# Patient Record
Sex: FEMALE | Race: WHITE
[De-identification: names, ages, dates, MRNs, and addresses within clinical notes are randomized per-mention and may not be internally consistent; named-entity substitution may affect disease eponyms.]

---

## 2021-01-19 ENCOUNTER — HOSPITAL ENCOUNTER (EMERGENCY)
Dept: HOSPITAL 41 - JD.ED | Age: 26
Discharge: HOME | End: 2021-01-19
Payer: MEDICAID

## 2021-01-19 DIAGNOSIS — M54.5: ICD-10-CM

## 2021-01-19 DIAGNOSIS — K21.9: ICD-10-CM

## 2021-01-19 DIAGNOSIS — Z72.0: ICD-10-CM

## 2021-01-19 DIAGNOSIS — Z79.899: ICD-10-CM

## 2021-01-19 DIAGNOSIS — R10.31: Primary | ICD-10-CM

## 2021-01-19 DIAGNOSIS — Z88.0: ICD-10-CM

## 2021-01-19 NOTE — EDM.PDOC
ED HPI GENERAL MEDICAL PROBLEM





- General


Chief Complaint: Abdominal Pain


Stated Complaint: RIGHT SIDE PAIN


Time Seen by Provider: 01/19/21 19:21


Source of Information: Reports: Patient, RN Notes Reviewed





- History of Present Illness


INITIAL COMMENTS - FREE TEXT/NARRATIVE: 





25 yr old female comes in with L low back and flank pain for 2 days that at 

times radiates to R groin.  She was seen at the clinic a short time ago, Ua was 

checked, reported to be "normal".   No voiding sx.  No fever, chills, nausea or 

vomiting.  No known recent injury.  Hx of kidney stones. Still has her appendix.

  


Treatments PTA: Reports: Other (see below)


Other Treatments PTA: UA prior to arrival


  ** Right Abdomen


Pain Score (Numeric/FACES): 7





- Related Data


                                    Allergies











Allergy/AdvReac Type Severity Reaction Status Date / Time


 


Penicillins Allergy  Anaphylactic Verified 01/19/21 19:22





   Shock  











Home Meds: 


                                    Home Meds





ARIPiprazole [Abilify] 10 mg PO DAILY 01/19/21 [History]


Acetaminophen/HYDROcodone [Norco 325-5 MG] 1 tab PO Q6H PRN #10 tablet 01/19/21 

[Rx]


Celecoxib [CeleBREX] 100 mg PO DAILY PRN 01/19/21 [History]


Famotidine [Pepcid] 20 mg PO BEDTIME 01/19/21 [History]


Loperamide [Imodium] 2 mg PO ASDIRECTED PRN 01/19/21 [History]


Omeprazole 40 mg PO BID 01/19/21 [History]


Propranolol [Inderal] 20 mg PO TID 01/19/21 [History]


QUEtiapine [SEROquel] 50 - 100 mg PO BEDTIME 01/19/21 [History]


lamoTRIgine [LaMICtal] 150 mg PO DAILY 01/19/21 [History]











Past Medical History


Gastrointestinal History: Reports: GERD


OB/GYN History: Reports: Pregnancy


Psychiatric History: Reports: Bipolar





- Past Surgical History


HEENT Surgical History: Reports: Adenoidectomy, Tonsillectomy


Female  Surgical History: Reports: Kidney stone extraction, Lithotripsy/ESWL, 

Tubal Ligation, Ureteral Stent





Social & Family History





- Tobacco Use


Tobacco Use Status *Q: Current Every Day Tobacco User


Years of Tobacco use: 10


Packs/Tins Daily: 0.7





- Caffeine Use


Caffeine Use: Reports: Coffee, Soda, Tea





- Alcohol Use


Days Per Week of Alcohol Use: 7


Number of Drinks Per Day: 6


Total Drinks Per Week: 42





- Recreational Drug Use


Recreational Drug Use: Yes


Drug Use in Last 12 Months: Yes


Recreational Drug Type: Reports: Marijuana/Hashish





ED ROS GENERAL





- Review of Systems


Review Of Systems: See Below


Constitutional: Denies: Fever, Chills, Diaphoresis


HEENT: Reports: No Symptoms


Respiratory: Denies: Shortness of Breath


Cardiovascular: Denies: Chest Pain


GI/Abdominal: Reports: Abdominal Pain.  Denies: Nausea, Vomiting


: Denies: Dysuria, Frequency


Musculoskeletal: Reports: Back Pain


Skin: Reports: No Symptoms


Neurological: Reports: No Symptoms





ED EXAM,LOWER BACK PAIN/INJURY





- Physical Exam


Exam: See Below


General Appearance: Alert, No Apparent Distress


Head: Atraumatic


Neck: Supple


Respiratory/Chest: No Respiratory Distress, Lungs Clear, Normal Breath Sounds


Cardiovascular: Regular Rate, Rhythm


GI/Abdominal: Soft, Non-Tender.  No: Guarding


Back Exam: CVA Tenderness (R) (mild).  No: Paraspinal Tenderness, Vertebral 

Tenderness


Extremities: Normal Inspection, Normal Range of Motion


Neurological: Alert, No Motor/Sensory Deficits


Skin Exam: Warm, Dry, Normal Color, No Rash





Course





- Vital Signs


Last Recorded V/S: 


                                Last Vital Signs











Temp  97.5 F   01/19/21 19:26


 


Pulse  92   01/19/21 19:26


 


Resp  16   01/19/21 19:26


 


BP  135/85   01/19/21 19:26


 


Pulse Ox  98   01/19/21 19:26














- Orders/Labs/Meds


Orders: 


                               Active Orders 24 hr











 Category Date Time Status


 


 Abdomen Pelvis wo Cont [CT] Stat Exams  01/19/21 19:33 Taken











Labs: 


                                Laboratory Tests











  01/19/21 01/19/21 Range/Units





  19:43 19:43 


 


WBC   13.85 H  (3.98-10.04)  K/mm3


 


RBC   4.77  (3.98-5.22)  M/mm3


 


Hgb   13.6  (11.2-15.7)  gm/dl


 


Hct   43.2  (34.1-44.9)  %


 


MCV   90.6  (79.4-94.8)  fl


 


MCH   28.5  (25.6-32.2)  pg


 


MCHC   31.5 L  (32.2-35.5)  g/dl


 


RDW Std Deviation   45.8  (36.4-46.3)  fL


 


Plt Count   292  (182-369)  K/mm3


 


MPV   9.9  (9.4-12.3)  fl


 


Neut % (Auto)   62.5  (34.0-71.1)  %


 


Lymph % (Auto)   26.6  (19.3-51.7)  %


 


Mono % (Auto)   6.5  (4.7-12.5)  %


 


Eos % (Auto)   3.9  (0.7-5.8)  


 


Baso % (Auto)   0.2  (0.1-1.2)  %


 


Neut # (Auto)   8.66 H  (1.56-6.13)  K/mm3


 


Lymph # (Auto)   3.68  (1.18-3.74)  K/mm3


 


Mono # (Auto)   0.90 H  (0.24-0.36)  K/mm3


 


Eos # (Auto)   0.54 H  (0.04-0.36)  K/mm3


 


Baso # (Auto)   0.03  (0.01-0.08)  K/mm3


 


Manual Slide Review   Normal smear  


 


C-Reactive Protein  <0.2   (<1.0)  mg/dL











Meds: 


Medications














Discontinued Medications














Generic Name Dose Route Start Last Admin





  Trade Name Freq  PRN Reason Stop Dose Admin


 


Oxycodone/Acetaminophen  1 tab  01/19/21 19:34  01/19/21 19:40





  Percocet 325-5 Mg  PO  01/19/21 19:35  1 tab





  ONETIME ONE   Administration














- Re-Assessments/Exams


Free Text/Narrative Re-Assessment/Exam: 





01/19/21 21:14


CT does not show any acute findings, appendix visualized and appears normal.  

WBC 13,000, CRP 0.2.  She states Ua checked at clinic this late afternoon was 

normal so that has not been repeated.   Discharge instr. as documented. 








Departure





- Departure


Time of Disposition: 21:18


Disposition: Home, Self-Care 01


Condition: Fair


Clinical Impression: 


 Back pain





Abdominal pain


Qualifiers:


 Abdominal location: right lower quadrant Qualified Code(s): R10.31 - Right 

lower quadrant pain








- Discharge Information


Prescriptions: 


Acetaminophen/HYDROcodone [Norco 325-5 MG] 1 tab PO Q6H PRN #10 tablet


 PRN Reason: Pain


Instructions:  Acute Back Pain, Adult, Abdominal Pain, Adult, Easy-to-Read


Referrals: 


Dorcas Gan MD [Primary Care Provider] - 


Forms:  ED Department Discharge


Additional Instructions: 


Clear liquids and very bland diet as tolerated.  Alternate heat and ice packs as

needed.  Alternate tylenol and ibuprofen for mild to moderate discomfort or 

hydrocodone if needed for more severe pain. Prescription has been sent 

electronic to [facility].   See Dr Kline Friday as planned.  Return 

to ED as needed.  





Sepsis Event Note (ED)





- Evaluation


Sepsis Screening Result: No Definite Risk





- Focused Exam


Vital Signs: 


                                   Vital Signs











  Temp Pulse Resp BP Pulse Ox


 


 01/19/21 19:26  97.5 F  92  16  135/85  98














- My Orders


Last 24 Hours: 


My Active Orders





01/19/21 19:33


Abdomen Pelvis wo Cont [CT] Stat 














- Assessment/Plan


Last 24 Hours: 


My Active Orders





01/19/21 19:33


Abdomen Pelvis wo Cont [CT] Stat

## 2021-01-20 NOTE — CT
CT abdomen and pelvis

 

Technique: Multiple axial sections were obtained from above the dome 

of the diaphragm inferiorly through the pubic symphysis.  Intravenous 

and oral contrast not utilized.  Study has been performed as a 

ureteral stone protocol.

 

Comparison: No previous study is available.

 

Findings: Small portion of the visualized lung bases show nothing 

acute.

 

Liver is enlarged and shows diffuse fatty infiltration.  Spleen 

appears within normal limits.  Adrenal glands show no nodule.  

Pancreas shows no discrete abnormality.  Gallbladder is mostly 

collapsed without definite calcifications to indicate calcified 

gallstones.

 

Both kidneys show multiple small nonobstructing calculi.  Calculi 

measure 5.9 mm and less in length.  No hydronephrosis is seen within 

either kidney.  No ureteral dilatation or ureteral stone is seen.  No 

bladder calculi are seen.

 

Aorta shows no aneurysm.  No retroperitoneal adenopathy or mesenteric 

abnormalities are seen.  Appendix is seen which is normal.  No pelvic 

mass or adenopathy is identified.  No free fluid or inflammatory 

change is appreciated.

 

Bone window settings were reviewed.  No acute osseous abnormality is 

appreciated.

 

Impression:

1.  Numerous nonobstructing small calculi within both kidneys.  No 

ureteral dilatation or ureteral stone is seen.

2.  Fatty infiltration within the liver.

3.  Nothing acute is definitely appreciated.

 

Diagnostic code #3

 

I agree with preliminary report from [facility], finalized on 01/19/21, 9:45

 PM CST

## 2021-02-14 ENCOUNTER — HOSPITAL ENCOUNTER (EMERGENCY)
Dept: HOSPITAL 41 - JD.ED | Age: 26
Discharge: HOME | End: 2021-02-14
Payer: MEDICAID

## 2021-02-14 DIAGNOSIS — M54.5: Primary | ICD-10-CM

## 2021-02-14 DIAGNOSIS — Z79.899: ICD-10-CM

## 2021-02-14 DIAGNOSIS — K21.9: ICD-10-CM

## 2021-02-14 DIAGNOSIS — R11.0: ICD-10-CM

## 2021-02-14 DIAGNOSIS — Z88.0: ICD-10-CM

## 2021-02-14 DIAGNOSIS — Z72.0: ICD-10-CM

## 2021-02-14 PROCEDURE — 81003 URINALYSIS AUTO W/O SCOPE: CPT

## 2021-02-14 PROCEDURE — 99284 EMERGENCY DEPT VISIT MOD MDM: CPT

## 2021-02-14 PROCEDURE — 96372 THER/PROPH/DIAG INJ SC/IM: CPT

## 2021-02-14 PROCEDURE — 74176 CT ABD & PELVIS W/O CONTRAST: CPT

## 2021-02-14 NOTE — EDM.PDOC
<Brennan Payne ISIS - Last Filed: 02/14/21 10:45>





ED HPI GENERAL MEDICAL PROBLEM





- General


Chief Complaint: Back Pain or Injury


Stated Complaint: KIDNEY PAIN/ R SIDE BACK PAIN


Time Seen by Provider: 02/14/21 10:45


  ** Right Middle Back


Pain Score (Numeric/FACES): 8





- Related Data


                                    Allergies











Allergy/AdvReac Type Severity Reaction Status Date / Time


 


Penicillins Allergy  Anaphylactic Verified 02/14/21 09:51





   Shock  











Home Meds: 


                                    Home Meds





ARIPiprazole [Abilify] 10 mg PO DAILY 01/19/21 [History]


Celecoxib [CeleBREX] 100 mg PO DAILY PRN 01/19/21 [History]


Famotidine [Pepcid] 20 mg PO BEDTIME 01/19/21 [History]


Loperamide [Imodium] 2 mg PO ASDIRECTED PRN 01/19/21 [History]


Omeprazole 40 mg PO BID 01/19/21 [History]


Propranolol [Inderal] 20 mg PO TID 01/19/21 [History]


QUEtiapine [SEROquel] 50 - 100 mg PO BEDTIME 01/19/21 [History]


lamoTRIgine [LaMICtal] 150 mg PO DAILY 01/19/21 [History]


Levothyroxine [Synthroid] 50 mcg PO DAILY 02/14/21 [History]











Past Medical History


Gastrointestinal History: Reports: GERD


OB/GYN History: Reports: Pregnancy


Psychiatric History: Reports: Bipolar





- Past Surgical History


HEENT Surgical History: Reports: Adenoidectomy, Tonsillectomy


Female  Surgical History: Reports: Kidney stone extraction, Lithotripsy/ESWL, 

Tubal Ligation, Ureteral Stent





Social & Family History





- Tobacco Use


Tobacco Use Status *Q: Current Every Day Tobacco User


Years of Tobacco use: 5


Packs/Tins Daily: 0.7





- Caffeine Use


Caffeine Use: Reports: None





- Recreational Drug Use


Recreational Drug Use: No





Departure





- Discharge Information


Referrals: 


Dorcas Gan MD [Primary Care Provider] - 


Forms:  ED Department Discharge





Sepsis Event Note (ED)





- Evaluation


Sepsis Screening Result: No Definite Risk





<Maribel Francois - Last Filed: 02/14/21 11:43>





ED HPI GENERAL MEDICAL PROBLEM





- General


Source of Information: Reports: Patient, RN Notes Reviewed


History Limitations: Reports: No Limitations





Course





- Vital Signs


Last Recorded V/S: 





                                Last Vital Signs











Temp  97.5 F   02/14/21 09:47


 


Pulse  98   02/14/21 09:47


 


Resp  16   02/14/21 09:47


 


BP  122/93 H  02/14/21 09:47


 


Pulse Ox  98   02/14/21 09:47














- Orders/Labs/Meds


Orders: 





                               Active Orders 24 hr











 Category Date Time Status


 


 Abdomen Pelvis wo Cont [CT] Stat Exams  02/14/21 11:17 Ordered











Labs: 





                                Laboratory Tests











  02/14/21 Range/Units





  10:20 


 


Urine Color  Yellow  (Yellow)  


 


Urine Appearance  Clear  (Clear)  


 


Urine pH  6.5  (5.0-8.0)  


 


Ur Specific Gravity  1.020  (1.005-1.030)  


 


Urine Protein  Negative  (Negative)  


 


Urine Glucose (UA)  Negative  (Negative)  


 


Urine Ketones  Negative  (Negative)  


 


Urine Occult Blood  Negative  (Negative)  


 


Urine Nitrite  Negative  (Negative)  


 


Urine Bilirubin  Negative  (Negative)  


 


Urine Urobilinogen  0.2  (0.2-1.0)  


 


Ur Leukocyte Esterase  Negative  (Negative)  











Meds: 





Medications














Discontinued Medications














Generic Name Dose Route Start Last Admin





  Trade Name Freq  PRN Reason Stop Dose Admin


 


Cyclobenzaprine HCl  10 mg  02/14/21 11:21  02/14/21 11:31





  Flexeril  PO  02/14/21 11:22  10 mg





  ONETIME ONE   Administration


 


Ketorolac Tromethamine  60 mg  02/14/21 11:21  02/14/21 11:31





  Toradol  IM  02/14/21 11:22  60 mg





  ONETIME ONE   Administration


 


Ondansetron HCl  Confirm  02/14/21 10:26  02/14/21 10:28





  Zofran Odt  Administered  02/14/21 10:27  Not Given





  Dose  





  4 mg  





  .ROUTE  





  .STK-MED ONE  


 


Ondansetron HCl  4 mg  02/14/21 10:27  02/14/21 10:28





  Zofran Odt  PO  02/14/21 10:28  4 mg





  ONETIME STA   Administration














Sepsis Event Note (ED)





- Focused Exam


Vital Signs: 





                                   Vital Signs











  Temp Pulse Resp BP Pulse Ox


 


 02/14/21 09:47  97.5 F  98  16  122/93 H  98














- My Orders


Last 24 Hours: 





My Active Orders





02/14/21 11:17


Abdomen Pelvis wo Cont [CT] Stat 














- Assessment/Plan


Last 24 Hours: 





My Active Orders





02/14/21 11:17


Abdomen Pelvis wo Cont [CT] Stat

## 2021-02-14 NOTE — CT
CT abdomen and pelvis

 

Technique: Multiple axial sections were obtained from above the 

kidneys inferiorly through the pubic symphysis.  Intravenous and oral 

contrast was not utilized.  Study has been performed as a renal stone 

protocol.

 

Comparison: Prior CT abdomen and pelvis exam of 01/19/21.

 

Findings: Visualized lung bases showed nothing acute.

 

Diffuse fatty infiltration is seen within the liver.  Gallbladder 

contains no calcified gallstones.  Spleen appears normal in size.  

Adrenal glands show no nodule.  Pancreas shows no discrete 

abnormality.  Aorta shows no aneurysm.  Small scattered lymph nodes 

within the retroperitoneum are seen  which are most likely normal.  

Appendix is seen which is normal in size.  No pelvic mass or 

adenopathy is seen.

 

Minimal diverticulosis is seen within the sigmoid colon with no 

inflammatory change.

 

Multiple small nonobstructing calculi are noted within both kidneys.  

No ureteral dilatation is seen.  No abnormal calcifications are seen 

along the course of the ureters.

 

Bone window settings were reviewed which show no acute osseous 

abnormality.

 

Impression:

1.  Multiple nonobstructing calculi within both kidneys.  No ureteral 

dilatation or ureteral stone is seen.

2.  Fatty infiltration throughout the liver.

3.  No acute abnormality is seen on noncontrast CT study of the 

abdomen and pelvis.

 

Diagnostic code #3

## 2021-02-14 NOTE — EDM.PDOC
ED HPI GENERAL MEDICAL PROBLEM





- General


Chief Complaint: Back Pain or Injury


Stated Complaint: KIDNEY PAIN/ R SIDE BACK PAIN


Time Seen by Provider: 02/14/21 10:45


Source of Information: Reports: Patient, RN Notes Reviewed


History Limitations: Reports: No Limitations





- History of Present Illness


INITIAL COMMENTS - FREE TEXT/NARRATIVE: 





Patient is a 25-year-old female presenting to the emergency department with 

complaints of right lower back pain.  Pain is worse with movement and with 

urination.  States this has been going on for a number of weeks.  She was seen 

in this emergency department on 19 January.  CT scan was done at that time and 

showed a number of calculi within the kidneys but not within the ureters.  She 

states that since that time she was seen by her primary care provider who 

advised her that she feels the pain is musculoskeletal in nature and recommends 

that she see physical therapy.  She has not done this thus far.  The pain has 

been worse over the last couple days.  She is convinced that it is being caused 

by her kidney.  She has taken Tylenol and ibuprofen with little relief.  She 

does have some nausea but has had no vomiting.  Denies any fever or chills.


  ** Right Middle Back


Pain Score (Numeric/FACES): 8





- Related Data


                                    Allergies











Allergy/AdvReac Type Severity Reaction Status Date / Time


 


Penicillins Allergy  Anaphylactic Verified 02/14/21 09:51





   Shock  











Home Meds: 


                                    Home Meds





ARIPiprazole [Abilify] 10 mg PO DAILY 01/19/21 [History]


Celecoxib [CeleBREX] 100 mg PO DAILY PRN 01/19/21 [History]


Famotidine [Pepcid] 20 mg PO BEDTIME 01/19/21 [History]


Loperamide [Imodium] 2 mg PO ASDIRECTED PRN 01/19/21 [History]


Omeprazole 40 mg PO BID 01/19/21 [History]


Propranolol [Inderal] 20 mg PO TID 01/19/21 [History]


QUEtiapine [SEROquel] 50 - 100 mg PO BEDTIME 01/19/21 [History]


lamoTRIgine [LaMICtal] 150 mg PO DAILY 01/19/21 [History]


Cyclobenzaprine [Flexeril] 10 mg PO TID PRN #10 tab 02/14/21 [Rx]


Levothyroxine [Synthroid] 50 mcg PO DAILY 02/14/21 [History]


Naproxen [Naprosyn] 500 mg PO Q12HR 5 Days #10 tab 02/14/21 [Rx]











Past Medical History


Gastrointestinal History: Reports: GERD


OB/GYN History: Reports: Pregnancy


Psychiatric History: Reports: Bipolar





- Past Surgical History


HEENT Surgical History: Reports: Adenoidectomy, Tonsillectomy


Female  Surgical History: Reports: Kidney stone extraction, Lithotripsy/ESWL, 

Tubal Ligation, Ureteral Stent





Social & Family History





- Tobacco Use


Tobacco Use Status *Q: Current Every Day Tobacco User


Years of Tobacco use: 5


Packs/Tins Daily: 0.7





- Caffeine Use


Caffeine Use: Reports: None





- Recreational Drug Use


Recreational Drug Use: No





ED ROS GENERAL





- Review of Systems


Review Of Systems: See Below


Constitutional: Reports: No Symptoms.  Denies: Fever, Chills, Weakness


HEENT: Reports: No Symptoms


Respiratory: Reports: No Symptoms


Cardiovascular: Reports: No Symptoms


Endocrine: Reports: No Symptoms


GI/Abdominal: Reports: Nausea.  Denies: Abdominal Pain, Vomiting


: Reports: No Symptoms


Musculoskeletal: Reports: Back Pain


Skin: Reports: No Symptoms


Neurological: Reports: No Symptoms


Psychiatric: Reports: No Symptoms


Hematologic/Lymphatic: Reports: No Symptoms


Immunologic: Reports: No Symptoms





ED EXAM,LOWER BACK PAIN/INJURY





- Physical Exam


Exam: See Below


General Appearance: Alert, WD/WN, No Apparent Distress


Respiratory/Chest: No Respiratory Distress, Lungs Clear, Normal Breath Sounds, 

No Accessory Muscle Use, Chest Non-Tender


Cardiovascular: Normal Peripheral Pulses, Regular Rate, Rhythm, No Edema, No 

Gallop, No JVD, No Murmur, No Rub


GI/Abdominal: Normal Bowel Sounds, Soft, Non-Tender, No Organomegaly, No 

Distention, No Abnormal Bruit, No Mass


Back Exam: Normal Inspection, Full Range of Motion, CVA Tenderness (R), 

Paraspinal Tenderness (To superficial touch)


Neurological: Alert, Normal Mood/Affect, Normal Dorsiflexion, CN II-XII Intact, 

Normal Plantar Flexion, Normal Gait, Normal Reflexes, No Motor/Sensory Deficits,

 Oriented x 3


Psychiatric: Normal Affect, Normal Mood


Skin Exam: Warm, Dry, Intact, Normal Color, No Rash





Course





- Vital Signs


Last Recorded V/S: 


                                Last Vital Signs











Temp  97.5 F   02/14/21 09:47


 


Pulse  98   02/14/21 09:47


 


Resp  16   02/14/21 09:47


 


BP  122/93 H  02/14/21 09:47


 


Pulse Ox  98   02/14/21 09:47














- Orders/Labs/Meds


Labs: 


                                Laboratory Tests











  02/14/21 Range/Units





  10:20 


 


Urine Color  Yellow  (Yellow)  


 


Urine Appearance  Clear  (Clear)  


 


Urine pH  6.5  (5.0-8.0)  


 


Ur Specific Gravity  1.020  (1.005-1.030)  


 


Urine Protein  Negative  (Negative)  


 


Urine Glucose (UA)  Negative  (Negative)  


 


Urine Ketones  Negative  (Negative)  


 


Urine Occult Blood  Negative  (Negative)  


 


Urine Nitrite  Negative  (Negative)  


 


Urine Bilirubin  Negative  (Negative)  


 


Urine Urobilinogen  0.2  (0.2-1.0)  


 


Ur Leukocyte Esterase  Negative  (Negative)  











Meds: 


Medications














Discontinued Medications














Generic Name Dose Route Start Last Admin





  Trade Name Freq  PRN Reason Stop Dose Admin


 


Cyclobenzaprine HCl  10 mg  02/14/21 11:21  02/14/21 11:31





  Flexeril  PO  02/14/21 11:22  10 mg





  ONETIME ONE   Administration


 


Ketorolac Tromethamine  60 mg  02/14/21 11:21  02/14/21 11:31





  Toradol  IM  02/14/21 11:22  60 mg





  ONETIME ONE   Administration


 


Ondansetron HCl  Confirm  02/14/21 10:26  02/14/21 10:28





  Zofran Odt  Administered  02/14/21 10:27  Not Given





  Dose  





  4 mg  





  .ROUTE  





  .STK-MED ONE  


 


Ondansetron HCl  4 mg  02/14/21 10:27  02/14/21 10:28





  Zofran Odt  PO  02/14/21 10:28  4 mg





  ONETIME STA   Administration














- Re-Assessments/Exams


Free Text/Narrative Re-Assessment/Exam: 


Patient is a 25-year-old female presenting to the emergency department with 

complaints of ongoing right back pain.  She states symptoms are worse with 

voiding.  Urinalysis was connected by triage nurse and shows no blood or 

infection in her urine.  On exam, she does have CVA tenderness but is also quite

 tender to superficial palpation of the right paraspinous muscles.  I highly 

suspect this is musculoskeletal in nature, however it was decided to repeat the 

CT scan to ensure that the kidney stones are not moving.  We will give her 

Toradol 60 mg IM as well as Flexeril 10 mg p.o.





02/14/21 12:10


CT of the abdomen pelvis shows multiple nonobstructing calculi within both 

kidneys with no ureteral dilatation or ureteral stone seen.  Also has fatty 

infiltrates throughout the liver.  This is unchanged from her previous CT.  

Discussed with patient that her pain is likely musculoskeletal is Dr. Fabian 

advised.  I will send a prescription for Naprosyn and Flexeril.  Recommend that 

she schedule an appointment with physical therapy as recommended by her primary 

care provider.  Discharge instructions as documented.





Departure





- Departure


Time of Disposition: 12:10


Disposition: Home, Self-Care 01


Condition: Good


Clinical Impression: 


Back pain


Qualifiers:


 Back pain location: low back pain Chronicity: acute Back pain laterality: right

 Sciatica presence: without sciatica Qualified Code(s): M54.5 - Low back pain








- Discharge Information


*PRESCRIPTION DRUG MONITORING PROGRAM REVIEWED*: No


*COPY OF PRESCRIPTION DRUG MONITORING REPORT IN PATIENT MAYDA: No


Prescriptions: 


Cyclobenzaprine [Flexeril] 10 mg PO TID PRN #10 tab


 PRN Reason: Muscle Spasm


Naproxen [Naprosyn] 500 mg PO Q12HR 5 Days #10 tab


Instructions:  Acute Back Pain, Adult


Referrals: 


Dorcas Gan MD [Primary Care Provider] - 


Forms:  ED Department Discharge


Additional Instructions: 


You were seen in the emergency department today for evaluation with regards to 

ongoing right-sided back pain.  Urinalysis and CT scan were completed and both 

were found to be normal.  He did not have any active moving kidney stones and 

you do not have a urinary tract infection.  As we discussed, your pain is 

musculoskeletal in nature.  A prescription for Naprosyn and Flexeril has been 

sent to [facility].  Uses medications as prescribed.  Recommend scheduling an

appointment with physical therapy as recommended by your primary care provider. 

If pain does not improve, recommend follow-up in the clinic with your PCP or 

return to ER as needed.





Sepsis Event Note (ED)





- Evaluation


Sepsis Screening Result: No Definite Risk





- Focused Exam


Vital Signs: 


                                   Vital Signs











  Temp Pulse Resp BP Pulse Ox


 


 02/14/21 09:47  97.5 F  98  16  122/93 H  98

## 2021-04-15 ENCOUNTER — HOSPITAL ENCOUNTER (EMERGENCY)
Dept: HOSPITAL 41 - JD.ED | Age: 26
Discharge: HOME | End: 2021-04-15
Payer: MEDICAID

## 2021-04-15 DIAGNOSIS — Z88.0: ICD-10-CM

## 2021-04-15 DIAGNOSIS — E66.9: ICD-10-CM

## 2021-04-15 DIAGNOSIS — Z79.899: ICD-10-CM

## 2021-04-15 DIAGNOSIS — F41.0: Primary | ICD-10-CM

## 2021-04-15 DIAGNOSIS — K21.9: ICD-10-CM

## 2021-04-15 DIAGNOSIS — E03.9: ICD-10-CM

## 2021-04-15 DIAGNOSIS — Z72.0: ICD-10-CM

## 2021-04-15 PROCEDURE — 99283 EMERGENCY DEPT VISIT LOW MDM: CPT

## 2021-04-15 NOTE — EDM.PDOCBH
ED HPI GENERAL MEDICAL PROBLEM





- General


Chief Complaint: Behavioral/Psych


Stated Complaint: SOB POSS ANXIETY NAUSEA


Time Seen by Provider: 04/15/21 06:27


Source of Information: Reports: Patient


History Limitations: Reports: No Limitations





- History of Present Illness


INITIAL COMMENTS - FREE TEXT/NARRATIVE: 





Ms. Espinosa is a pleasant 25-year-old woman with a past medical history 

significant for anxiety, depression, and bipolar affective disorder, currently 

prescribed Seroquel and Lamictal, who now presents to the ED stating that she 

has been suffering from a panic attack for approximately 1 week, including 

symptoms of dyspnea, feeling anxious, diaphoresis, and insomnia, along with 4 

days of nausea and vomiting, and some watery diarrhea today.  No recent fever.





The patient states that she has been compliant with her medications.  She states

that she has had similar symptoms countless times in the past.  She acknowledges

that she has not contacted either her PCP or her Psychiatrist.her symptoms over 

the past week.





Here in the ED, the patient is found to be slightly tachycardic at 106 bpm, with

tachypnea of 24 rpm.  She is otherwise hemodynamically stable, afebrile, 

saturating 99% on room air.  She appears to be quite anxious, pacing the exam 

room.





Prior to 1 week ago, the patient denies having a recent fever, chills, sore 

throat, ear pain, nasal or sinus congestion, cough, dyspnea, chest pain, 

palpitations, nausea, vomiting, constipation, diarrhea, abdominal pain, urinary 

symptoms, recent weight gain or weight loss, recent bloody bowel movements or 

black bowel movements, recent joint aches, headaches, or rashes.








The patient's PCP is Dr. Dorcas Kline.


She does not recall the name of her Psychiatrist, at [facility].











- Related Data


                                    Allergies











Allergy/AdvReac Type Severity Reaction Status Date / Time


 


Penicillins Allergy  Anaphylactic Verified 04/15/21 06:25





   Shock  











Home Meds: 


                                    Home Meds





ARIPiprazole [Abilify] 10 mg PO DAILY 01/19/21 [History]


Celecoxib [CeleBREX] 100 mg PO DAILY PRN 01/19/21 [History]


Famotidine [Pepcid] 20 mg PO BEDTIME 01/19/21 [History]


Loperamide [Imodium] 2 mg PO ASDIRECTED PRN 01/19/21 [History]


Omeprazole 40 mg PO BID 01/19/21 [History]


Propranolol [Inderal] 20 mg PO TID 01/19/21 [History]


QUEtiapine [SEROquel] 50 - 100 mg PO BEDTIME 01/19/21 [History]


lamoTRIgine [LaMICtal] 150 mg PO DAILY 01/19/21 [History]


Cyclobenzaprine [Flexeril] 10 mg PO TID PRN #10 tab 02/14/21 [Rx]


Levothyroxine [Synthroid] 50 mcg PO DAILY 02/14/21 [History]


Naproxen [Naprosyn] 500 mg PO Q12HR 5 Days #10 tab 02/14/21 [Rx]











Past Medical History


Gastrointestinal History: Reports: GERD


Genitourinary History: Reports: Renal Calculus


Psychiatric History: Reports: Anxiety, Bipolar, Depression


Endocrine/Metabolic History: Reports: Hypothyroidism, Obesity/BMI 30+





- Past Surgical History


HEENT Surgical History: Reports: Adenoidectomy, Tonsillectomy


Female  Surgical History: Reports: Kidney stone extraction, Lithotripsy/ESWL, 

Tubal Ligation, Ureteral Stent





Social & Family History





- Tobacco Use


Tobacco Use Status *Q: Current Every Day Tobacco User


Years of Tobacco use: 11


Packs/Tins Daily: 0.8


Tobacco Use Comment: Started smoking at 14 yrs old





- Caffeine Use


Caffeine Use: Reports: None





- Alcohol Use


Alcohol Use History: No





- Recreational Drug Use


Recreational Drug Use: Yes


Drug Use in Last 12 Months: Yes


Recreational Drug Type: Reports: Marijuana/Hashish ("whenever I can")





- Living Situation & Occupation


Living situation: Reports: Single, with Significant Other (Boyfriend), with 

Family (3 kids)


Occupation: Unemployed





ED ROS GENERAL





- Review of Systems


Review Of Systems: Comprehensive ROS is negative, except as noted in HPI.





ED EXAM, BEHAVIORAL HEALTH





- Physical Exam


Exam: See Below


Exam Limited By: No Limitations


General Appearance: Alert, WD/WN, No Apparent Distress, Anxious


Eye Exam: Bilateral Eye: EOMI, Normal Inspection


Ears: Normal External Exam, Hearing Grossly Normal


Nose: Normal Inspection


Throat/Mouth: Normal Inspection, Normal Lips, Normal Voice, No Airway Compromise


Head: Atraumatic, Normocephalic


Neck: Normal Inspection, Full Range of Motion


Respiratory/Chest: No Respiratory Distress, Lungs Clear, Normal Breath Sounds, 

No Accessory Muscle Use


Cardiovascular: Normal Peripheral Pulses, Regular Rate, Rhythm, No Gallop, No 

JVD, No Murmur, No Rub


GI/Abdominal: Normal Bowel Sounds, Soft, Non-Tender, No Organomegaly, No 

Distention, No Abnormal Bruit, No Mass


Back Exam: Normal Inspection, Full Range of Motion, NT


Extremities: Normal Inspection, Normal Range of Motion, Normal Capillary Refill


Neurological: Alert, Normal Cognition, No Motor/Sensory Deficits, Oriented x 3


Psychiatric: Restless (pacing the exam room)


Skin Exam: Warm, Dry, Intact, Normal color, No rash





COURSE, BEHAVIORAL HEALTH COMP





- Course


Vital Signs: 





                                Last Vital Signs











Temp  36.6 C   04/15/21 06:22


 


Pulse  106 H  04/15/21 06:22


 


Resp  24 H  04/15/21 06:22


 


BP  125/90   04/15/21 06:22


 


Pulse Ox  99   04/15/21 06:22











Orders, Labs, Meds: 





Medications














Discontinued Medications














Generic Name Dose Route Start Last Admin





  Trade Name Freq  PRN Reason Stop Dose Admin


 


Lorazepam  1 mg  04/15/21 06:39 





  Lorazepam 1 Mg Tab  PO  04/15/21 06:40 





  ONETIME ONE  











Medical Clearance: 





04/15/21 06:40


As above, the patient has been feeling extremely anxious for the past week, 

including dyspnea, diaphoresis, and insomnia, with nausea and vomiting for the 

past 4 days, and some diarrhea today.  She has been compliant with her 

prescribed psychiatric medications, however, she did not contact her PCP or her 

Psychiatrist about her current symptoms.  For today's purposes, I will give the 

patient a single dose of Ativan, however, I explained to the patient that I am 

not going to be prescribing any additional, because her PCP is well aware of her

 recurrent anxiety symptoms, and has elected, for whatever reason, to not 

prescribe a benzodiazepine as treatment.  I explained to the patient that it 

would not be appropriate for us to go behind Dr. Kline's back and prescribe 

one.  Since today is Thursday, there is no reason why the patient cannot contact

 Dr. Kline's office later this morning.  The patient is in agreement.











Departure





- Departure


Time of Disposition: 06:43


Disposition: Home, Self-Care 01


Condition: Good


Clinical Impression: 


 Panic attack








- Discharge Information


*PRESCRIPTION DRUG MONITORING PROGRAM REVIEWED*: Not Applicable


*COPY OF PRESCRIPTION DRUG MONITORING REPORT IN PATIENT MAYDA: Not Applicable


Referrals: 


Dorcas Gan MD [Primary Care Provider] - 


Additional Instructions: 


You were seen in the emergency room for symptoms of anxiety, including shortness

of breath, sweatiness, and insomnia, with 4 days of nausea vomiting, and 

diarrhea today.





You were treated with a single dose of the sedative Ativan, however, as 

explained, a prescription for Ativan was not provided, as the decision to 

prescribe such a medicine is up to your PCP, Dr. Dorcas Kline.





We recommend that you call the office of Dr. Kline this morning, to see what 

she recommends.





If any other problems, please do not hesitate to return to the ER.





Sepsis Event Note (ED)





- Evaluation


Sepsis Screening Result: No Definite Risk





- Focused Exam


Vital Signs: 





                                   Vital Signs











  Temp Pulse Resp BP Pulse Ox


 


 04/15/21 06:22  36.6 C  106 H  24 H  125/90  99

## 2021-05-19 ENCOUNTER — HOSPITAL ENCOUNTER (EMERGENCY)
Dept: HOSPITAL 41 - JD.ED | Age: 26
Discharge: HOME | End: 2021-05-19
Payer: MEDICAID

## 2021-05-19 DIAGNOSIS — Z72.0: ICD-10-CM

## 2021-05-19 DIAGNOSIS — K21.9: ICD-10-CM

## 2021-05-19 DIAGNOSIS — K52.9: Primary | ICD-10-CM

## 2021-05-19 DIAGNOSIS — Z79.899: ICD-10-CM

## 2021-05-19 DIAGNOSIS — E66.9: ICD-10-CM

## 2021-05-19 DIAGNOSIS — Z88.0: ICD-10-CM

## 2021-05-19 DIAGNOSIS — E03.9: ICD-10-CM

## 2021-05-19 PROCEDURE — 87493 C DIFF AMPLIFIED PROBE: CPT

## 2021-05-19 PROCEDURE — 87046 STOOL CULTR AEROBIC BACT EA: CPT

## 2021-05-19 PROCEDURE — 96375 TX/PRO/DX INJ NEW DRUG ADDON: CPT

## 2021-05-19 PROCEDURE — 87328 CRYPTOSPORIDIUM AG IA: CPT

## 2021-05-19 PROCEDURE — 99284 EMERGENCY DEPT VISIT MOD MDM: CPT

## 2021-05-19 PROCEDURE — 85610 PROTHROMBIN TIME: CPT

## 2021-05-19 PROCEDURE — 83516 IMMUNOASSAY NONANTIBODY: CPT

## 2021-05-19 PROCEDURE — 85025 COMPLETE CBC W/AUTO DIFF WBC: CPT

## 2021-05-19 PROCEDURE — 96374 THER/PROPH/DIAG INJ IV PUSH: CPT

## 2021-05-19 PROCEDURE — 87045 FECES CULTURE AEROBIC BACT: CPT

## 2021-05-19 PROCEDURE — 45330 DIAGNOSTIC SIGMOIDOSCOPY: CPT

## 2021-05-19 PROCEDURE — 82784 ASSAY IGA/IGD/IGG/IGM EACH: CPT

## 2021-05-19 PROCEDURE — 87329 GIARDIA AG IA: CPT

## 2021-05-19 PROCEDURE — 84443 ASSAY THYROID STIM HORMONE: CPT

## 2021-05-19 PROCEDURE — 36415 COLL VENOUS BLD VENIPUNCTURE: CPT

## 2021-05-19 PROCEDURE — 83735 ASSAY OF MAGNESIUM: CPT

## 2021-05-19 PROCEDURE — 87899 AGENT NOS ASSAY W/OPTIC: CPT

## 2021-05-19 PROCEDURE — 86140 C-REACTIVE PROTEIN: CPT

## 2021-05-19 PROCEDURE — 80053 COMPREHEN METABOLIC PANEL: CPT

## 2021-05-19 NOTE — EDM.PDOC
ED HPI GENERAL MEDICAL PROBLEM





- General


Chief Complaint: Abdominal Pain


Stated Complaint: DIARRHEA/BLOOD IN STOOL/VOMITING/ABDOMINAL PAIN


Time Seen by Provider: 05/19/21 08:46


Source of Information: Reports: Patient


History Limitations: Reports: No Limitations





- History of Present Illness


INITIAL COMMENTS - FREE TEXT/NARRATIVE: 





25-year-old female presents to the ED with bleeding per rectum.  Patient reports

that she has had a chronic issues for many years of intestinal hurry and chronic

diarrhea.  She has had colonoscopies and other investigations with no positive 

diagnoses.  She has been vomiting over the night.  She has been on the toilet 

since about 3:00 this morning and had multiple loose diarrhea stools usually 

yellow and mucousy.  Usually food will go through her within about 20 minutes of

eating.  Associated mild diffuse lower abdominal cramping pain.  This morning 

she had bright red blood per rectum on multiple occasions with wiping.  She has 

no known history of hemorrhoids.  Patient is on numerous antipsychotic 

medications.  She takes loperamide or Imodium at least once or twice daily to 

slow her bowels down.  She has not taken any medicine for medications this 

morning.  Only previous abdominal surgery has been that of laparoscopic tubal 

ligation


Onset: Other (Chronic issues with intermittent bleeding per rectum)


Duration: Chronic, Getting Worse


Location: Reports: Abdomen (Chronic abdominal cramping pain with yellow 

diarrhea.  Today is associate with bright red bleeding per rectum.  Unlikely to 

be related to foodborne illness.), Lower Extremity, Right


Quality: Reports: Other


Severity: Moderate (Bright red blood per rectum particular noted with wiping.)


Improves with: Reports: None


Worsens with: Reports: Eating


Context: Reports: Other (Chronic diarrhea with intestinal hurry.).  Denies: 

Activity (Eating will make it worse.), Exercise, Lifting, Sick Contact, Trauma


Associated Symptoms: Reports: Loss of Appetite, Malaise, Nausea/Vomiting, 

Weakness.  Denies: Confusion, Chest Pain, Cough, cough w sputum, Diaphoresis, 

Fever/Chills, Headaches, Rash, Seizure (Nausea today with vomiting overnight.  

Bilious emesis with no blood), Shortness of Breath, Syncope


Treatments PTA: Reports: Other (see below)


  ** Middle Abdomen


Pain Score (Numeric/FACES): 6





- Related Data


                                    Allergies











Allergy/AdvReac Type Severity Reaction Status Date / Time


 


Penicillins Allergy  Anaphylactic Verified 05/19/21 08:44





   Shock  











Home Meds: 


                                    Home Meds





Celecoxib [CeleBREX] 100 mg PO DAILY PRN 01/19/21 [History]


Loperamide [Imodium] 2 mg PO ASDIRECTED PRN 01/19/21 [History]


Omeprazole 40 mg PO BID 01/19/21 [History]


Propranolol [Inderal] 20 mg PO TID 01/19/21 [History]


QUEtiapine [SEROquel] 50 - 100 mg PO BEDTIME 01/19/21 [History]


lamoTRIgine [LaMICtal] 150 mg PO DAILY 01/19/21 [History]


Cyclobenzaprine [Flexeril] 10 mg PO TID PRN #10 tab 02/14/21 [Rx]


Levothyroxine [Synthroid] 50 mcg PO DAILY 02/14/21 [History]


Dicyclomine [Bentyl] 20 mg PO Q6H PRN #20 tablet 05/19/21 [Rx]


Ondansetron [Zofran] 4 mg BUCCAL Q6H PRN #12 tab 05/19/21 [Rx]











Past Medical History


Gastrointestinal History: Reports: Chronic Diarrhea (Chronic diarrhea for last 5

 to 6 years.), GERD


Genitourinary History: Reports: Renal Calculus


OB/GYN History: Reports: Pregnancy


Psychiatric History: Reports: Anxiety, Bipolar, Depression


Endocrine/Metabolic History: Reports: Hypothyroidism, Obesity/BMI 30+





- Past Surgical History


HEENT Surgical History: Reports: Adenoidectomy, Tonsillectomy


Female  Surgical History: Reports: Kidney stone extraction, Lithotripsy/ESWL, 

Tubal Ligation, Ureteral Stent





Social & Family History





- Tobacco Use


Tobacco Use Status *Q: Current Every Day Tobacco User


Years of Tobacco use: 10


Packs/Tins Daily: 0.7





- Caffeine Use


Caffeine Use: Reports: None





- Recreational Drug Use


Recreational Drug Type: Reports: Marijuana/Hashish





- Living Situation & Occupation


Living situation: Reports: Single, with Significant Other (Boyfriend), with 

Family (3 kids)


Occupation: Unemployed





ED ROS GENERAL





- Review of Systems


Review Of Systems: See Below


Constitutional: Reports: Malaise, Weakness, Fatigue.  Denies: Fever, Chills, 

Weight Loss


HEENT: Reports: Glasses


Respiratory: Reports: No Symptoms


Cardiovascular: Reports: No Symptoms


Endocrine: Reports: Fatigue


GI/Abdominal: Reports: Abdominal Pain, Diarrhea (Chronic diarrhea with 

intermittent blood per rectum.  Usually has anywhere between 6 and 10 bowel 

movements per day.  Depending if she takes Imodium which does slow down)


: Reports: No Symptoms ( and help form up her stool occasionally.)


Musculoskeletal: Reports: Joint Pain (Takes )


Skin: Reports: No Symptoms (Celebrex on an intermittent basis for muscul

oskeletal pain.)


Neurological: Reports: Dizziness, Weakness.  Denies: Headache, Numbness, 

Syncope, Tingling


Psychiatric: Reports: Mood Lability (History of bipolar affective disorder.)


Hematologic/Lymphatic: Reports: No Symptoms


Immunologic: Reports: No Symptoms





ED EXAM, GI/ABD





- Physical Exam


Exam: See Below


Exam Limited By: No Limitations


General Appearance: Alert, WD/WN, Anxious, Mild Distress, Other (Temperature is 

36.1 degrees.  Heart rate 105 and sinus respiratory 16 with O2 sats of 97% room 

air BP is mildly elevated 1/31/2002.)


Eyes: Bilateral: Normal Appearance (No scleral icterus or blepharal pallor.)


Throat/Mouth: Normal Inspection, Normal Lips, Normal Oropharynx, Other


Head: Atraumatic (Tongue is dry and coated), Normocephalic


Neck: Normal Inspection, Supple, Non-Tender, Full Range of Motion.  No: 

Lymphadenopathy (L), Lymphadenopathy (R)


Respiratory/Chest: No Respiratory Distress, Lungs Clear, Normal Breath Sounds, 

No Accessory Muscle Use


Cardiovascular: Normal Peripheral Pulses, Regular Rate, Rhythm, No Edema, No 

Gallop, No Murmur, Tachycardia (Resting tachycardia 100/min.)


GI/Abdominal Exam: Normal Bowel Sounds, Soft, Non-Tender, No Organomegaly, No 

Abnormal Bruit, No Mass, Pelvis Stable, Other (Abdomen is obese.).  No: Guarding

 (  No localized tenderness other than in the epigastrium.  Very mild left lower

 quadrant.), Rigid, Rebound, Tender (She has a healed wound inferior aspect of 

the umbilicus from previous tubal ligation done laparoscopically.)


Rectal (Female) Exam: Normal Exam, Normal Rectal Tone


Extremities: Normal Inspection, Normal Range of Motion, Non-Tender, No Pedal 

Edema


Neurological: Alert, Oriented, CN II-XII Intact, Normal Cognition


Psychiatric: Anxious


Skin Exam: Warm, Dry, Intact, Normal Color, No Rash





ED ABDOMINAL/GI PROCEDURES





- Additional/Other Procedure(s)


Procedure(s) (Free Text): 





Rigid sigmoidoscopy has been performed up to 17 cm. No blood is encountered in 

the upper colon. The colon wall looks healthy with no ulcerations. Slight yellow

 diarrhea was encountered. She has a good deal of inflammation at the anus at 

the 6 o'clock position with a small anal fissure. There was one hemorrhoid at 

the 7 o'clock position that is quite small internally and not bleeding. Patient 

tolerated the procedure well.





Course





- Vital Signs


Last Recorded V/S: 


                                Last Vital Signs











Temp  36.1 C   05/19/21 08:40


 


Pulse  105 H  05/19/21 08:40


 


Resp  16   05/19/21 08:40


 


BP  130/102 H  05/19/21 08:40


 


Pulse Ox  97   05/19/21 08:40














- Orders/Labs/Meds


Orders: 


                               Active Orders 24 hr











 Category Date Time Status


 


 C DIFFICILE PCR W/REFLEX [MOLEC] Stat Lab  05/19/21 08:57 Ordered


 


 CELIAC AB TTG DGP TIGA [REF] Stat Lab  05/19/21 09:15 Received


 


 OVA & PARASITES BY IMMUNOASSAY [MREF] Stat Lab  05/19/21 09:53 Ordered


 


 STOOL CULTURE/SHIGA TOXIN [MREF] Stat Lab  05/19/21 08:56 Ordered


 


 Dextrose 5%-Lactated Ringers 1,000 ml Med  05/19/21 09:00 Active





 IV ASDIRECTED   








                                Medication Orders





Dextrose/Lactated Ringer's (Dextrose 5%-Lactated Ringers)  1,000 mls @ 999 

mls/hr IV ASDIRECTED GILDA


   Last Admin: 05/19/21 09:16  Dose: 999 mls/hr


   Documented by: MARY JO








Labs: 


                                Laboratory Tests











  05/19/21 05/19/21 05/19/21 Range/Units





  09:15 09:15 09:15 


 


WBC  9.92    (3.98-10.04)  K/mm3


 


RBC  5.47 H    (3.98-5.22)  M/mm3


 


Hgb  15.6  D    (11.2-15.7)  gm/dl


 


Hct  48.1 H    (34.1-44.9)  %


 


MCV  87.9    (79.4-94.8)  fl


 


MCH  28.5    (25.6-32.2)  pg


 


MCHC  32.4    (32.2-35.5)  g/dl


 


RDW Std Deviation  45.1    (36.4-46.3)  fL


 


Plt Count  328    (182-369)  K/mm3


 


MPV  10.7    (9.4-12.3)  fl


 


Neut % (Auto)  64.5    (34.0-71.1)  %


 


Lymph % (Auto)  25.1    (19.3-51.7)  %


 


Mono % (Auto)  8.3    (4.7-12.5)  %


 


Eos % (Auto)  1.7    (0.7-5.8)  


 


Baso % (Auto)  0.3    (0.1-1.2)  %


 


Neut # (Auto)  6.40 H    (1.56-6.13)  K/mm3


 


Lymph # (Auto)  2.49    (1.18-3.74)  K/mm3


 


Mono # (Auto)  0.82 H    (0.24-0.36)  K/mm3


 


Eos # (Auto)  0.17    (0.04-0.36)  K/mm3


 


Baso # (Auto)  0.03    (0.01-0.08)  K/mm3


 


PT     (9.7-12.0)  SECONDS


 


INR     


 


Sodium   141   (136-145)  mEq/L


 


Potassium   3.8   (3.5-5.1)  mEq/L


 


Chloride   103   ()  mEq/L


 


Carbon Dioxide   25   (21-32)  mEq/L


 


Anion Gap   16.8 H   (5-15)  


 


BUN   9   (7-18)  mg/dL


 


Creatinine   0.9   (0.55-1.02)  mg/dL


 


Est Cr Clr Drug Dosing   80.42   mL/min


 


Estimated GFR (MDRD)   > 60   (>60)  mL/min


 


BUN/Creatinine Ratio   10.0 L   (14-18)  


 


Glucose   107 H   (70-99)  mg/dL


 


Calcium   9.7   (8.5-10.1)  mg/dL


 


Magnesium   1.6 L   (1.8-2.4)  mg/dL


 


Total Bilirubin   0.6   (0.2-1.0)  mg/dL


 


AST   54 H   (15-37)  U/L


 


ALT   118 H   (14-59)  U/L


 


Alkaline Phosphatase   89   ()  U/L


 


C-Reactive Protein   0.6   (<1.0)  mg/dL


 


Total Protein   8.2   (6.4-8.2)  g/dl


 


Albumin   4.5   (3.4-5.0)  g/dl


 


Globulin   3.7   gm/dL


 


Albumin/Globulin Ratio   1.2   (1-2)  


 


TSH 3rd Generation    2.149  (0.358-3.74)  uIU/mL














  05/19/21 Range/Units





  09:53 


 


WBC   (3.98-10.04)  K/mm3


 


RBC   (3.98-5.22)  M/mm3


 


Hgb   (11.2-15.7)  gm/dl


 


Hct   (34.1-44.9)  %


 


MCV   (79.4-94.8)  fl


 


MCH   (25.6-32.2)  pg


 


MCHC   (32.2-35.5)  g/dl


 


RDW Std Deviation   (36.4-46.3)  fL


 


Plt Count   (182-369)  K/mm3


 


MPV   (9.4-12.3)  fl


 


Neut % (Auto)   (34.0-71.1)  %


 


Lymph % (Auto)   (19.3-51.7)  %


 


Mono % (Auto)   (4.7-12.5)  %


 


Eos % (Auto)   (0.7-5.8)  


 


Baso % (Auto)   (0.1-1.2)  %


 


Neut # (Auto)   (1.56-6.13)  K/mm3


 


Lymph # (Auto)   (1.18-3.74)  K/mm3


 


Mono # (Auto)   (0.24-0.36)  K/mm3


 


Eos # (Auto)   (0.04-0.36)  K/mm3


 


Baso # (Auto)   (0.01-0.08)  K/mm3


 


PT  10.9  (9.7-12.0)  SECONDS


 


INR  1.02  


 


Sodium   (136-145)  mEq/L


 


Potassium   (3.5-5.1)  mEq/L


 


Chloride   ()  mEq/L


 


Carbon Dioxide   (21-32)  mEq/L


 


Anion Gap   (5-15)  


 


BUN   (7-18)  mg/dL


 


Creatinine   (0.55-1.02)  mg/dL


 


Est Cr Clr Drug Dosing   mL/min


 


Estimated GFR (MDRD)   (>60)  mL/min


 


BUN/Creatinine Ratio   (14-18)  


 


Glucose   (70-99)  mg/dL


 


Calcium   (8.5-10.1)  mg/dL


 


Magnesium   (1.8-2.4)  mg/dL


 


Total Bilirubin   (0.2-1.0)  mg/dL


 


AST   (15-37)  U/L


 


ALT   (14-59)  U/L


 


Alkaline Phosphatase   ()  U/L


 


C-Reactive Protein   (<1.0)  mg/dL


 


Total Protein   (6.4-8.2)  g/dl


 


Albumin   (3.4-5.0)  g/dl


 


Globulin   gm/dL


 


Albumin/Globulin Ratio   (1-2)  


 


TSH 3rd Generation   (0.358-3.74)  uIU/mL











Meds: 


Medications











Generic Name Dose Route Start Last Admin





  Trade Name Freq  PRN Reason Stop Dose Admin


 


Dextrose/Lactated Ringer's  1,000 mls @ 999 mls/hr  05/19/21 09:00  05/19/21 

09:16





  Dextrose 5%-Lactated Ringers  IV   999 mls/hr





  ASDIRECTED GILDA   Administration














Discontinued Medications














Generic Name Dose Route Start Last Admin





  Trade Name Freq  PRN Reason Stop Dose Admin


 


Hydromorphone HCl  0.5 mg  05/19/21 08:55  05/19/21 09:17





  Hydromorphone 0.5 Mg/0.5 Ml Syringe  IVPUSH  05/19/21 08:56  0.5 mg





  ONETIME ONE   Administration


 


Lidocaine HCl  10 ml  05/19/21 08:58  05/19/21 09:34





  Lidocaine 2% Jelly 10 Ml Urojet  MUCMEM  05/19/21 08:59  10 ml





  ONETIME ONE   Administration


 


Ondansetron HCl  4 mg  05/19/21 08:55  05/19/21 09:16





  Ondansetron 4 Mg/2 Ml Sdv  IVPUSH  05/19/21 08:56  4 mg





  ONETIME ONE   Administration














- Radiology Interpretation


Free Text/Narrative:: 


25-year-old female presents to the ED with a chronic history of diarrhea for 

which she has no diagnosis.  She usually has between 6 and 12 bowel movements 

per day and has for many years.  Most recently in the last 2 weeks she has had 2

 bouts of diarrhea associate with chills blood per rectum and mucus.  This is 

more noticed with wiping.  She denies any pain at the rectum or no painful bowel

 movement.  This happened this morning.  She has been up on the toilet since 

3:00 this morning having a bowel movement on average every 1/2 hour associate 

with nausea vomiting.  No real history to suggest bad food intake.  She does not

 look anemic.  Plan IV will be D5 Ringer's lactate at open.  Will be given 

Dilaudid 0.5 mg IV for abdominal cramping pain which she rates as 4-10.  Zofran 

4 mg IV for nausea relief.  Routine labs to be collected including a TSH.  Plan 

will be to perform a rigid sigmoidoscopy to look for source of rectal bleeding. 

By researching her current med list multiple medications that she is taking can 

cause diarrhea as a side effect. This includes propanolol which she is taking 20

 mg 3 times daily. Seroquel can cause diarrhea. Celebrex can cause diarrhea. And

 high-dose omeprazole can cause diarrhea. She is on 40 mg twice daily. Flexeril 

is likely cause anticholinergic effect of constipation.








- Re-Assessments/Exams


Free Text/Narrative Re-Assessment/Exam: 





05/19/21 10:08 White count is normal at 9.92. Differential reveals 64.5% 

neutrophils on the auto differential. Hemoglobin is 15.6. Hematocrit is 48.1. 

Platelet count is 328,000. Sodium 141 with potassium of 3.8. Chloride 103 with a

 bicarb of 25. Anion gap is minimally elevated at 16.8. BUN is nine with a 

creatinine of 0.9. GFR is greater than 60. Glucose is 107 calcium 9.7 magnesium 

slightly low at 1.6 total bilirubin is 0.6. AST is 54 with an ALT of 118 

presumably due to fatty liver disease. Alkaline phosphatase is 89. C-reactive 

protein is 0.6. Total protein is 8.2 albumin fraction is 4.5.





05/19/21 11:51 Patient ate all of her breakfast/dinner without any issues.  No 

increased pain no nausea vomiting and no diarrhea.  She will thus be sent home 

to collect stool for culture and sensitivity and ova and parasites like although

 anticipate them to be normal.  My suspicion is that her current chronic 

diarrhea is likely due to medications.  40 mg of omeprazole twice daily is very 

famous for causing cramping and diarrhea.  Similarly ,Lamictal can cause 

diarrhea.  Propanolol can cause diarrhea especially taken 3 times daily.  

Celebrex can cause diarrhea as well although she indicates she rarely takes this

 medication.  Patient had prescription written for Zofran 4 mg sublingual to be 

taken every 6 hours as needed for nausea or vomiting relief.  Bentyl 20 mg every

 6 hours as needed for relief of abdominal Pain and/or diarrhea.  I have sent 

away lab work for gluten intolerance although anticipate this to be normal as 

she is not got the body habitus of someone with chronic sprue.














Departure





- Departure


Time of Disposition: 11:35


Disposition: Home, Self-Care 01


Condition: Fair


Clinical Impression: 


 Chronic diarrhea of unknown origin





Nausea and vomiting


Qualifiers:


 Vomiting type: bilious vomiting Qualified Code(s): R11.14 - Bilious vomiting








- Discharge Information


*PRESCRIPTION DRUG MONITORING PROGRAM REVIEWED*: Not Applicable


*COPY OF PRESCRIPTION DRUG MONITORING REPORT IN PATIENT MAYDA: Not Applicable


Prescriptions: 


Dicyclomine [Bentyl] 20 mg PO Q6H PRN #20 tablet


 PRN Reason: Abdominal cramps/diarrhea


Ondansetron [Zofran] 4 mg BUCCAL Q6H PRN #12 tab


 PRN Reason: nausea or vomiting


Instructions:  Nausea and Vomiting, Adult, Diarrhea, Adult, Easy-to-Read


Referrals: 


Dorcas Gan MD [Primary Care Provider] - 


Forms:  ED Department Discharge


Additional Instructions: 


Evaluation in the emergency room today in regards to intractable nausea and 

vomiting that started overnight associated with chronic diarrhea.  Diarrhea was 

significant overnight with noted blood on wiping.  Sigmoidoscope exam performed 

reveals a small anal fissure at the 6 o'clock position and inflammation of the 

anus without any ulcerations.  No sign of ulcerative colitis or inflammatory 

bowel disease.  The mucosa of the lower rectum appears otherwise healthy.  There

was 1 small internal hemorrhoid that was not bleeding at the 7 o'clock position.

 You were not able to produce a stool sample while in the emergency room.  It is

my suggestion that you collect 2 separate stool samples for ova and parasites 

and culture and sensitivity and bring them back to the lab once they are 

available for analysis.  I believe they are send outs to a larger lab at this 

time and answers will not be back for between 3 and 5 days.  Looking at your med

list there are multiple medications that may be playing a role in chronic 

diarrhea such as propanolol 20 mg 3 times daily is associated with diarrhea and 

some patients.  Alternative medicine may be available to you for anxiety relief.

 Omeprazole 40 mg twice daily is very common to be causing chronic diarrhea and 

abdominal cramping pain.  Lamictal can cause diarrhea as can Lamictal on 

occasions.  It may be worth a drug holiday dropping 1 medication at a time for a

week or more to see if diarrhea improves.  I have written prescriptions for 

Zofran 4 mg to be taken under the tongue every 4-6 hours necessary for relief of

nausea or vomiting.  Bentyl 20 mg by mouth every 6 hours as needed for relief of

abdominal cramping pain and it will reduce diarrhea as well.  Try and avoid all 

dairy products if possible and this means anything that contains lactose.  Foods

that are high in lactose include all ice creams except for [employer] ice 

cream.  It is found high concentration in meats particularly hot dogs.  It is 

found a high concentration in [place] cream cheese and a lot of desserts.  

It is found the end of course pizza both in the toe and in the cheeses.  It is 

also found high concentration and many prescription medications as a filler with

dried milk powder.





Sepsis Event Note (ED)





- Evaluation


Sepsis Screening Result: No Definite Risk





- Focused Exam


Vital Signs: 


                                   Vital Signs











  Temp Pulse Resp BP Pulse Ox


 


 05/19/21 08:40  36.1 C  105 H  16  130/102 H  97














- My Orders


Last 24 Hours: 


My Active Orders





05/19/21 08:56


STOOL CULTURE/SHIGA TOXIN [MREF] Stat 





05/19/21 08:57


C DIFFICILE PCR W/REFLEX [MOLEC] Stat 





05/19/21 09:00


Dextrose 5%-Lactated Ringers 1,000 ml IV ASDIRECTED 





05/19/21 09:15


CELIAC AB TTG DGP TIGA [REF] Stat 





05/19/21 09:53


OVA & PARASITES BY IMMUNOASSAY [MREF] Stat 














- Assessment/Plan


Last 24 Hours: 


My Active Orders





05/19/21 08:56


STOOL CULTURE/SHIGA TOXIN [MREF] Stat 





05/19/21 08:57


C DIFFICILE PCR W/REFLEX [MOLEC] Stat 





05/19/21 09:00


Dextrose 5%-Lactated Ringers 1,000 ml IV ASDIRECTED 





05/19/21 09:15


CELIAC AB TTG DGP TIGA [REF] Stat 





05/19/21 09:53


OVA & PARASITES BY IMMUNOASSAY [MREF] Stat

## 2021-10-28 NOTE — PCM.PREANE
<Otto Alves R - Last Filed: 10/29/21 08:02>





Preanesthetic Assessment





- Anesthesia/Transfusion/Family Hx


Anesthesia History: Prior Anesthesia Reaction (With EGD she had GERD with 

procedure)


Family History of Anesthesia Reaction: No


Transfusion History: No Prior Transfusion(s)


Intubation History: Unknown





- Review of Systems


General: No Symptoms


Pulmonary: No Symptoms, Other (asthma "once over last month")


Gastrointestinal: No Symptoms


Neurological: No Symptoms


Other: Reports: Depression, Anxiety





- Physical Assessment


NPO Status Time: 21:00


ASA Class: 3


Mental Status: Alert & Oriented x3


Airway Class: Mallampati = 1


Dentition: Reports: Normal Dentition, Caries


Thyro-Mental Finger Breadths: 3


Mouth Opening Finger Breadths: 3


ROM/Head Extension: Full


Lungs: Clear to Auscultation, Normal Respiratory Effort


Cardiovascular: Regular Rate, Regular Rhythm





- Allergies


Allergies/Adverse Reactions: 


                                    Allergies











Allergy/AdvReac Type Severity Reaction Status Date / Time


 


Penicillins Allergy  Anaphylactic Verified 10/29/21 07:14





   Shock  














- Anesthesia Plan


Pre-Op Medication Ordered: Other (reglan 10mg IV, Pepcid 20mg IV, and p.o. 

bicitra 30ml's: @ 0700)





- Acknowledgements


Anesthesia Type Planned: General Anesthesia


Pt an Appropriate Candidate for the Planned Anesthesia: Yes


Alternatives and Risks of Anesthesia Discussed w Pt/Guardian: Yes


Pt/Guardian Understands and Agrees with Anesthesia Plan: Yes





PreAnesthesia Questionnaire


Gastrointestinal History: Reports: Chronic Diarrhea, GERD





- SUBSTANCE USE


Tobacco Use Status *Q: Current Every Day Tobacco User


Tobacco Use Within Last Twelve Months: Cigarettes


Second Hand Smoke Exposure: No


Days Per Week of Alcohol Use: 0


Number of Drinks Per Day: 0


Total Drinks Per Week: 0


Recreational Drug Use History: Yes


Recreational Drug Type: Reports: Marijuana/Hashish (last used yesterday)





- HOME MEDS


Home Medications: 


                                    Home Meds





Celecoxib [CeleBREX] 100 mg PO DAILY PRN 01/19/21 [History]


Loperamide [Imodium] 2 mg PO ASDIRECTED PRN 01/19/21 [History]


Omeprazole 40 mg PO BID 01/19/21 [History]


Albuterol Sulfate [Proair Hfa] 2 puff INH Q4H PRN 10/28/21 [History]


Cyclobenzaprine [Flexeril] 5 - 10 mg PO TID PRN 10/28/21 [History]


Escitalopram Oxalate [Lexapro] 10 mg PO DAILY 10/28/21 [History]


Fluticasone Propionate [Flonase] 1 dose NASBOTH DAILY 10/28/21 [History]


Fluticasone Propionate [Flovent HFA] 2 puff INH BID 10/28/21 [History]


Ibuprofen 600 mg PO TID PRN 10/28/21 [History]


QUEtiapine [SEROquel] 100 mg PO DAILY 10/28/21 [History]











<Ibeth Christiansen - Last Filed: 10/29/21 08:49>





Preanesthetic Assessment





- Procedure


Proposed Procedure: 





Uterine D and C,  Hysteroscopy, Novasure.





- Anesthesia/Transfusion/Family Hx


Anesthesia History: Prior Anesthesia Reaction


Family History of Anesthesia Reaction: No


Transfusion History: No Prior Transfusion(s)


Intubation History: Unknown





- Review of Systems


Pulmonary: No Symptoms (Asthma, Smoker: 1ppd times    years. Marijuana:     

ETOH:), Other


Gastrointestinal: No Symptoms (GERD, IBS, Gastroparesis history of: aspiration 

risk.)


Neurological: No Symptoms


Other: Reports: Depression (Bipolar/PSTD), Anxiety





- Physical Assessment


NPO Status Date: 10/28/21


Vital Signs: 





HR:2


Sat:95%


Temp:98


B/P:137/84


Resp:16


Height: 1.6 m


Weight: 95 kg


ASA Class: 3


Mental Status: Alert & Oriented x3





- Lab


Values: 





All labs reviewed and noted and within acceptable ranges to proceed with 

scheduled procedure.





- Anesthesia Plan


Pre-Op Medication Ordered: Other (reglan 10mg IV, Pepcid 20mg IV, and p.o. 

bicitra 30ml's: @ 0659)





- Acknowledgements


Anesthesia Type Planned: General Anesthesia


Pt an Appropriate Candidate for the Planned Anesthesia: Yes


Alternatives and Risks of Anesthesia Discussed w Pt/Guardian: Yes


Pt/Guardian Understands and Agrees with Anesthesia Plan: Yes





PreAnesthesia Questionnaire


Gastrointestinal History: Reports: Chronic Diarrhea (Chronic diarrhea for last 5

 to 6 years.), GERD


Genitourinary History: Reports: Renal Calculus


OB/GYN History: Reports: Pregnancy


Psychiatric History: Reports: Anxiety, Bipolar, Depression, PTSD


Endocrine/Metabolic History: Reports: Hypothyroidism, Obesity/BMI 30+





- Past Surgical History


HEENT Surgical History: Reports: Adenoidectomy, Tonsillectomy


Female  Surgical History: Reports: Kidney stone extraction, Lithotripsy/ESWL, 

Tubal Ligation, Ureteral Stent





- CURRENT (IN HOUSE) MEDS


Current Meds: 





                               Current Medications





Lactated Ringer's (Ringers, Lactated)  1,000 mls @ 125 mls/hr IV ASDIRECTED GILDA


   Stop: 10/29/21 23:00


   Last Admin: 10/29/21 06:59 Dose:  125 mls/hr


   Documented by: 


Clindamycin Phosphate 900 mg/ (Premix)  50 mls @ 100 mls/hr IV ONETIME ONE


   Stop: 10/29/21 08:28


Lidocaine/Sodium Bicarbonate (Lidocaine 1%/Sod Bicarbonate In Ns 8.4% 1 Ml 

Syringe)  0.25 ml IDERM ONETIME PRN


   PRN Reason: Prior to IV Start


   Stop: 10/29/21 18:00


Metoclopramide HCl (Metoclopramide 10 Mg/2 Ml Sdv)  10 mg IV ONETIME PRN


   PRN Reason: Nausea/Vomiting


   Last Admin: 10/29/21 06:59 Dose:  10 mg


   Documented by: 


Sodium Chloride (Sodium Chloride 0.9% 10 Ml Syringe)  10 ml FLUSH ASDIRECTED PRN


   PRN Reason: Keep Vein Open


   Stop: 10/29/21 18:00





Discontinued Medications





Citric Acid/Sodium Citrate (Citric Acid/Sodium Citrate Solution 30 Ml Cup)  30 

ml PO ONETIME ONE


   Stop: 10/29/21 06:34


   Last Admin: 10/29/21 06:59 Dose:  30 ml


   Documented by: 


Dexamethasone (Dexamethasone 4 Mg/Ml 5 Ml Mdv) Confirm Administered Dose 20 mg 

.ROUTE .STK-MED ONE


   Stop: 10/29/21 06:50


Famotidine (Famotidine 20 Mg/2 Ml Sdv)  20 mg IVPUSH ONETIME ONE


   Stop: 10/29/21 06:34


   Last Admin: 10/29/21 06:59 Dose:  20 mg


   Documented by: 


Fentanyl (Fentanyl 250 Mcg/5 Ml Sdv) Confirm Administered Dose 250 mcg .ROUTE 

.STK-MED ONE


   Stop: 10/29/21 06:51


Lidocaine HCl (Xylocaine-Mpf 1%) Confirm Administered Dose 4 mls @ as directed 

.ROUTE .STK-MED ONE


   Stop: 10/29/21 06:50


Lactated Ringer's (Ringers, Lactated) Confirm Administered Dose 1,000 mls @ as 

directed .ROUTE .STK-MED ONE


   Stop: 10/29/21 06:50


Ketorolac Tromethamine (Ketorolac 30 Mg/Ml Sdv) Confirm Administered Dose 30 mg 

.ROUTE .STK-MED ONE


   Stop: 10/29/21 06:50


Midazolam HCl (Midazolam 1 Mg/Ml 2 Ml Sdv) Confirm Administered Dose 2 mg .ROUTE

 .STK-MED ONE


   Stop: 10/29/21 06:51


Ondansetron HCl (Ondansetron 4 Mg/2 Ml Sdv) Confirm Administered Dose 4 mg 

.ROUTE .STK-MED ONE


   Stop: 10/29/21 06:50


Propofol (Propofol 200 Mg/20 Ml Sdv) Confirm Administered Dose 200 mg .ROUTE 

.STK-MED ONE


   Stop: 10/29/21 06:51

## 2021-10-29 ENCOUNTER — HOSPITAL ENCOUNTER (OUTPATIENT)
Dept: HOSPITAL 41 - JD.SDS | Age: 26
Discharge: HOME | End: 2021-10-29
Attending: OBSTETRICS & GYNECOLOGY
Payer: MEDICAID

## 2021-10-29 DIAGNOSIS — F41.9: ICD-10-CM

## 2021-10-29 DIAGNOSIS — Z98.890: ICD-10-CM

## 2021-10-29 DIAGNOSIS — F32.9: ICD-10-CM

## 2021-10-29 DIAGNOSIS — Z88.0: ICD-10-CM

## 2021-10-29 DIAGNOSIS — Z79.899: ICD-10-CM

## 2021-10-29 DIAGNOSIS — Z20.822: ICD-10-CM

## 2021-10-29 DIAGNOSIS — F17.210: ICD-10-CM

## 2021-10-29 DIAGNOSIS — N92.0: Primary | ICD-10-CM

## 2021-10-29 DIAGNOSIS — K21.9: ICD-10-CM

## 2021-10-29 PROCEDURE — 80053 COMPREHEN METABOLIC PANEL: CPT

## 2021-10-29 PROCEDURE — 86850 RBC ANTIBODY SCREEN: CPT

## 2021-10-29 PROCEDURE — 86901 BLOOD TYPING SEROLOGIC RH(D): CPT

## 2021-10-29 PROCEDURE — 85025 COMPLETE CBC W/AUTO DIFF WBC: CPT

## 2021-10-29 PROCEDURE — 36415 COLL VENOUS BLD VENIPUNCTURE: CPT

## 2021-10-29 PROCEDURE — 58558 HYSTEROSCOPY BIOPSY: CPT

## 2021-10-29 PROCEDURE — 86900 BLOOD TYPING SEROLOGIC ABO: CPT

## 2021-10-29 NOTE — PCM.POSTAN
POST ANESTHESIA ASSESSMENT





- MENTAL STATUS


Mental Status: Alert





- VITAL SIGNS


Vital Signs: 


                                Last Vital Signs











Temp 98.5   10/29/21 1016


 


Pulse  84  10/29/21 1016


 


Resp  16   10/29/21 1016


 


BP  93/75   10/29/21 1016


 


Pulse Ox  96%   10/29/21 1016














- RESPIRATORY


Respiratory Status: Respiratory Rate WNL, Airway Patent, O2 Saturation Stable, 

Supplemental Oxygen





- CARDIOVASCULAR


CV Status: Pulse Rate WNL, Blood Pressure Stable





- GASTROINTESTINAL


GI Status: No Symptoms





- POST OP HYDRATION


Hydration Status: Adequate & Stable

## 2021-10-29 NOTE — PCM.OPNOTE
- General Post-Op/Procedure Note


Date of Surgery/Procedure: 10/29/21


Operative Procedure(s): hysteroscopy, dilation and curettage, novasure


Findings: 





normal cavity, length 6.5, width 4, power 13, time 53 seconds


Pre Op Diagnosis: menorrhagia


Post-Op Diagnosis: Same


Anesthesia Technique: General ET Tube


Primary Surgeon: Carol Joseph


Anesthesia Provider: Ibeth Christiansen


Complications: None


Condition: Good


Free Text/Narrative:: 


Taken to OR. Prepped and draped in dorsal lithotomy. Speculum placed. Anterior 

lip of cervix grasped with tenaculum. Cavity length obtained. Dilated to allow 

passage of hysteroscope. Hysteroscopy showed normal cavity. Curettage preformed.

Novasure assessment passed. Novasure deployed and 53 second time. Post novasure 

hysteroscopy showed good effect. Instruments removed. Hemostasis at tenaculum 

site.

## 2021-10-29 NOTE — [id number]
Post Anesthesia Note





- EVALUATION WITHIN 48HRS OF ANESTHETIC


Vital Signs in Normal Range: Yes


Patient Participated in Evaluation: Yes


Respiratory Function Stable: Yes


Airway Patent: Yes


Cardiovascular Function Stable: Yes


Hydration Status Stable: Yes


Pain Control Satisfactory: Yes


Nausea and Vomiting Control Satisfactory: Yes


Mental Status Recovered: Yes


Vital Signs: 


                                Last Vital Signs











Temp  36.7 C   10/29/21 06:40


 


Pulse  92   10/29/21 06:40


 


Resp  16   10/29/21 06:40


 


BP  134/84   10/29/21 06:40


 


Pulse Ox  95   10/29/21 06:40

## 2021-11-27 ENCOUNTER — HOSPITAL ENCOUNTER (EMERGENCY)
Dept: HOSPITAL 41 - JD.ED | Age: 26
Discharge: LEFT BEFORE DISCHARGE | End: 2021-11-27
Payer: MEDICAID

## 2021-11-27 DIAGNOSIS — Z53.21: Primary | ICD-10-CM

## 2021-12-20 ENCOUNTER — HOSPITAL ENCOUNTER (EMERGENCY)
Dept: HOSPITAL 41 - JD.ED | Age: 26
Discharge: HOME | End: 2021-12-20
Payer: MEDICAID

## 2021-12-20 DIAGNOSIS — K21.9: ICD-10-CM

## 2021-12-20 DIAGNOSIS — E03.9: ICD-10-CM

## 2021-12-20 DIAGNOSIS — E66.9: ICD-10-CM

## 2021-12-20 DIAGNOSIS — Z79.899: ICD-10-CM

## 2021-12-20 DIAGNOSIS — M43.6: ICD-10-CM

## 2021-12-20 DIAGNOSIS — G44.209: Primary | ICD-10-CM

## 2021-12-20 PROCEDURE — 99283 EMERGENCY DEPT VISIT LOW MDM: CPT

## 2021-12-20 PROCEDURE — 96372 THER/PROPH/DIAG INJ SC/IM: CPT

## 2021-12-20 NOTE — EDM.PDOC
ED HPI GENERAL MEDICAL PROBLEM





- General


Chief Complaint: General


Stated Complaint: HEAD AND NECK PAIN


Time Seen by Provider: 12/20/21 17:42


Source of Information: Reports: Patient, RN Notes Reviewed


History Limitations: Reports: No Limitations





- History of Present Illness


INITIAL COMMENTS - FREE TEXT/NARRATIVE: 





Patient is a 26-year-old female who presents to the ER for her ongoing head/neck

pain.  This is been going on for some time.  Patient states she has been started

on sumatriptan, topiramate and Flexeril for her headache and neck pain.  States 

that the sumatriptan seem to help the migraines but states she still has a 

"headache".  Patient has been using ibuprofen for ongoing management as well and

her last dose was earlier this afternoon.  She did take some Flexeril today as 

well and this does not seem to be helping at all.  She has been try to use hot 

and cold to the area, and massage therapy.  States that it hurts to move her 

neck at all, she is not sure if it is musculoskeletal or otherwise.  But she is 

also complaining of difficulty raising her right arm.  Primary care provider is 

Dr. Kline.  She states that she did try to call Dr. Kline's office today 

however she was not in and they directed her to the ER for further management.  

Patient denies any other sick-like symptoms, fever/chills, cough/shortness of 

breath, nausea/vomiting/diarrhea.





Treatments PTA: Reports: Heat Therapy, NSAIDS, Other Medication(s)


  ** Right Neck


Pain Score (Numeric/FACES): 8





- Related Data


                                    Allergies











Allergy/AdvReac Type Severity Reaction Status Date / Time


 


Penicillins Allergy Severe Anaphylactic Verified 12/20/21 17:46





   Shock  











Home Meds: 


                                    Home Meds





Celecoxib [CeleBREX] 100 mg PO DAILY PRN 01/19/21 [History]


Loperamide [Imodium] 2 mg PO ASDIRECTED PRN 01/19/21 [History]


Omeprazole 40 mg PO BID 01/19/21 [History]


Albuterol Sulfate [Proair Hfa] 2 puff INH Q4H PRN 10/28/21 [History]


Escitalopram Oxalate [Lexapro] 10 mg PO DAILY 10/28/21 [History]


QUEtiapine [SEROquel] 100 mg PO DAILY 10/28/21 [History]


Orphenadrine [Norflex] 100 mg PO BID PRN #20 tab 12/20/21 [Rx]


Topiramate 25 mg PO ASDIRECTED 12/20/21 [History]


busPIRone [Buspar] 10 mg PO BID 12/20/21 [History]


predniSONE 20 mg PO ASDIRECTED #15 tab 12/20/21 [Rx]











Past Medical History


HEENT History: Reports: Impaired Vision


Cardiovascular History: Reports: None


Respiratory History: Reports: Asthma


Gastrointestinal History: Reports: Chronic Diarrhea, GERD


Other Gastrointestinal History: gastroparesis, IBS


Genitourinary History: Reports: Renal Calculus


OB/GYN History: Reports: Pregnancy


Musculoskeletal History: Reports: None


Neurological History: Reports: None


Psychiatric History: Reports: Anxiety, Bipolar, Depression, PTSD


Endocrine/Metabolic History: Reports: Hypothyroidism, Obesity/BMI 30+


Hematologic History: Reports: None


Immunologic History: Reports: None


Oncologic (Cancer) History: Reports: None


Dermatologic History: Reports: None





- Infectious Disease History


Infectious Disease History: Reports: None





- Past Surgical History


Head Surgeries/Procedures: Reports: None


HEENT Surgical History: Reports: Adenoidectomy, Tonsillectomy


Cardiovascular Surgical History: Reports: None


Respiratory Surgical History: Reports: None


GI Surgical History: Reports: Colonoscopy, EGD


Female  Surgical History: Reports: Kidney stone extraction, Lithotripsy/ESWL, 

Tubal Ligation, Ureteral Stent


Endocrine Surgical History: Reports: None


Neurological Surgical History: Reports: None


Musculoskeletal Surgical History: Reports: None


Oncologic Surgical History: Reports: None


Dermatological Surgical History: Reports: None





Social & Family History





- Caffeine Use


Caffeine Use: Reports: Soda, Tea





- Living Situation & Occupation


Living situation: Reports: Single, with Significant Other (Boyfriend), with 

Family (3 kids)


Occupation: Unemployed





ED ROS GENERAL





- Review of Systems


Review Of Systems: Comprehensive ROS is negative, except as noted in HPI.





ED EXAM, GENERAL





- Physical Exam


Exam: See Below


Exam Limited By: No Limitations


General Appearance: Alert, WD/WN, No Apparent Distress


Eye Exam: Bilateral Eye: EOMI, Normal Inspection, PERRL


Neck: Limited Range of Motion (of neck d/t pain, states that it hurts to move 

her neck in any ROM)


Respiratory/Chest: No Respiratory Distress, Lungs Clear, Normal Breath Sounds, 

No Accessory Muscle Use, Chest Non-Tender


Cardiovascular: Normal Peripheral Pulses, Regular Rate, Rhythm, No Edema


Extremities: Normal Inspection, Normal Capillary Refill


Neurological: Alert, Oriented, Normal Cognition, No Motor/Sensory Deficits


Psychiatric: Normal Affect, Normal Mood


Skin Exam: Warm, Dry, Intact, Normal Color, No Rash





Course





- Vital Signs


Last Recorded V/S: 


                                Last Vital Signs











Temp  97.9 F   12/20/21 17:43


 


Pulse  80   12/20/21 17:43


 


Resp  20   12/20/21 17:43


 


BP  115/80   12/20/21 17:43


 


Pulse Ox  100   12/20/21 17:43














- Orders/Labs/Meds


Meds: 


Medications














Discontinued Medications














Generic Name Dose Route Start Last Admin





  Trade Name Oenilq  PRN Reason Stop Dose Admin


 


Diphenhydramine HCl  25 mg  12/20/21 18:00  12/20/21 18:23





  Diphenhydramine 50 Mg/Ml Sdv  IM  12/20/21 18:01  25 mg





  ONETIME ONE   Administration


 


Ketorolac Tromethamine  30 mg  12/20/21 18:00  12/20/21 18:22





  Ketorolac 30 Mg/Ml Sdv  IM  12/20/21 18:01  30 mg





  ONETIME ONE   Administration


 


Metoclopramide HCl  10 mg  12/20/21 18:00  12/20/21 18:24





  Metoclopramide 10 Mg/2 Ml Sdv  IM  12/20/21 18:01  10 mg





  ONETIME ONE   Administration














- Re-Assessments/Exams


Free Text/Narrative Re-Assessment/Exam: 





12/20/21 18:09


Patient presents to the ER for evaluation of her head and neck pain, we will go 

ahead and do a CT of her head as this is been going on for some time, and she is

 now having some right arm weakness/difficulty with range of motion.  Albeit 

this is likely musculoskeletal; I do believe a neurological evaluation is 

warranted.  We will give the patient Toradol, Reglan and Benadryl for her 

headache.  Plan would be to start the patient hopefully on Norflex versus 

Flexeril, and trial prednisone with her if head CT is negative.





12/20/21 19:07


Patient states that she has had a prolonged wait and would like to not have a CT

 performed, this seems fine with me she states she will follow up with Dr. Kline for outpatient management.  States that she does have her neck pain 

still, and thinks she may have slept wrong on her pillow but the headache has 

gone away after the medications.  We will trial her on Norflex have her stop the

 Flexeril, we will have her take oral prednisone as well for ongoing management 

have her stop ibuprofen.





Departure





- Departure


Time of Disposition: 19:08


Disposition: Home, Self-Care 01


Condition: Good


Clinical Impression: 


 Torticollis





Headache


Qualifiers:


 Headache type: tension-type Headache chronicity pattern: acute headache 

Intractability: not intractable Qualified Code(s): G44.209 - Tension-type 

headache, unspecified, not intractable








- Discharge Information


*PRESCRIPTION DRUG MONITORING PROGRAM REVIEWED*: No


*COPY OF PRESCRIPTION DRUG MONITORING REPORT IN PATIENT MAYDA: No


Instructions:  General Headache Without Cause, Easy-to-Read, Acute Torticollis, 

Adult


Referrals: 


Dorcas Gan MD [Primary Care Provider] - 


Forms:  ED Department Discharge


Additional Instructions: 


You were evaluated in the ED for your headache and neck pain.





You were given a combination of medications for management. This did seem to 

provide you pretty good relief of your symptoms.





Recommend that you go home and rest in a quiet, darkened room. Try also to keep 

well hydrated.





Neck pain is thought to be due to a pinched nerve, or musculoskeletal origin, 

you need to stop Flexeril and oral ibuprofen.  You have been given a 

prescription for oral Norflex and prednisone.  Please take the prednisone 2 

times a day for the first 5 days and then once daily for the last 5 days.  

Norflex will be 2 times a day as needed for muscle spasms.  Please note that 

this medication can make you a little bit sleepy, so try to caution use of this 

during the day until you know how its going to affect you.  This medication was 

electronically sent to the [place] pharmacy located in the [facility] [employer] [facility].





Recommend you follow-up with Dr. Kline for outpatient CT purposes as your 

headache has been going on for some time and it does probably warrant further 

investigation rather than medication at this time.





Please return to the ED if your symptoms should change or worsen.





Sepsis Event Note (ED)





- Focused Exam


Vital Signs: 


                                   Vital Signs











  Temp Pulse Resp BP Pulse Ox


 


 12/20/21 17:43  97.9 F  80  20  115/80  100

## 2022-01-19 ENCOUNTER — HOSPITAL ENCOUNTER (EMERGENCY)
Dept: HOSPITAL 41 - JD.ED | Age: 27
Discharge: HOME | End: 2022-01-19
Payer: MEDICAID

## 2022-01-19 DIAGNOSIS — Z88.0: ICD-10-CM

## 2022-01-19 DIAGNOSIS — M79.7: ICD-10-CM

## 2022-01-19 DIAGNOSIS — R51.9: ICD-10-CM

## 2022-01-19 DIAGNOSIS — Z79.899: ICD-10-CM

## 2022-01-19 DIAGNOSIS — F32.2: Primary | ICD-10-CM

## 2022-01-19 DIAGNOSIS — E66.9: ICD-10-CM

## 2022-01-19 PROCEDURE — 84443 ASSAY THYROID STIM HORMONE: CPT

## 2022-01-19 PROCEDURE — 83735 ASSAY OF MAGNESIUM: CPT

## 2022-01-19 PROCEDURE — 82550 ASSAY OF CK (CPK): CPT

## 2022-01-19 PROCEDURE — 71045 X-RAY EXAM CHEST 1 VIEW: CPT

## 2022-01-19 PROCEDURE — 87086 URINE CULTURE/COLONY COUNT: CPT

## 2022-01-19 PROCEDURE — 36415 COLL VENOUS BLD VENIPUNCTURE: CPT

## 2022-01-19 PROCEDURE — 81001 URINALYSIS AUTO W/SCOPE: CPT

## 2022-01-19 PROCEDURE — 80053 COMPREHEN METABOLIC PANEL: CPT

## 2022-01-19 PROCEDURE — 70450 CT HEAD/BRAIN W/O DYE: CPT

## 2022-01-19 PROCEDURE — 86140 C-REACTIVE PROTEIN: CPT

## 2022-01-19 PROCEDURE — 96375 TX/PRO/DX INJ NEW DRUG ADDON: CPT

## 2022-01-19 PROCEDURE — 85652 RBC SED RATE AUTOMATED: CPT

## 2022-01-19 PROCEDURE — 96374 THER/PROPH/DIAG INJ IV PUSH: CPT

## 2022-01-19 PROCEDURE — 96376 TX/PRO/DX INJ SAME DRUG ADON: CPT

## 2022-01-19 PROCEDURE — 99284 EMERGENCY DEPT VISIT MOD MDM: CPT

## 2022-01-19 PROCEDURE — 85025 COMPLETE CBC W/AUTO DIFF WBC: CPT
